# Patient Record
Sex: FEMALE | Race: WHITE | Employment: FULL TIME | URBAN - METROPOLITAN AREA
[De-identification: names, ages, dates, MRNs, and addresses within clinical notes are randomized per-mention and may not be internally consistent; named-entity substitution may affect disease eponyms.]

---

## 2017-06-07 ENCOUNTER — HOSPITAL ENCOUNTER (OUTPATIENT)
Dept: MAMMOGRAPHY | Age: 57
Discharge: HOME | End: 2017-06-07
Attending: GENERAL PRACTICE
Payer: COMMERCIAL

## 2017-06-07 DIAGNOSIS — Z12.31 VISIT FOR SCREENING MAMMOGRAM: ICD-10-CM

## 2017-06-07 PROCEDURE — 77067 SCR MAMMO BI INCL CAD: CPT

## 2018-06-04 ENCOUNTER — OFFICE VISIT (OUTPATIENT)
Dept: FAMILY MEDICINE CLINIC | Facility: CLINIC | Age: 58
End: 2018-06-04

## 2018-06-04 VITALS
SYSTOLIC BLOOD PRESSURE: 120 MMHG | OXYGEN SATURATION: 94 % | WEIGHT: 293 LBS | DIASTOLIC BLOOD PRESSURE: 68 MMHG | TEMPERATURE: 97.9 F | RESPIRATION RATE: 18 BRPM | BODY MASS INDEX: 45.99 KG/M2 | HEIGHT: 67 IN | HEART RATE: 73 BPM

## 2018-06-04 DIAGNOSIS — I10 ESSENTIAL HYPERTENSION: Primary | ICD-10-CM

## 2018-06-04 DIAGNOSIS — Z13.31 SCREENING FOR DEPRESSION: ICD-10-CM

## 2018-06-04 DIAGNOSIS — Z01.89 LABORATORY TEST: ICD-10-CM

## 2018-06-04 DIAGNOSIS — Z12.39 SCREENING FOR BREAST CANCER: ICD-10-CM

## 2018-06-04 DIAGNOSIS — R73.03 PRE-DIABETES: ICD-10-CM

## 2018-06-04 DIAGNOSIS — E66.01 MORBID OBESITY (HCC): ICD-10-CM

## 2018-06-04 RX ORDER — AMLODIPINE BESYLATE 5 MG/1
5 TABLET ORAL DAILY
COMMUNITY
End: 2018-06-13 | Stop reason: DRUGHIGH

## 2018-06-04 RX ORDER — LOSARTAN POTASSIUM AND HYDROCHLOROTHIAZIDE 12.5; 5 MG/1; MG/1
1 TABLET ORAL DAILY
COMMUNITY
End: 2018-06-04

## 2018-06-04 NOTE — PROGRESS NOTES
History and Physical    Today's Date:  2018   Patient's Name: Sam Pfeiffer   Patient's :  1960     History:     Chief Complaint   Patient presents with   [address]    Hypertension    Weight Management     Sam Pfeiffer is a 63 y.o. female presenting for initial visit to establish care. Will obtain records from previous provider to review. Care team updated on [facility] care. Hypertension   This is a chronic problem, new to me. BP is at goal. Pt takes losartan/HCTZ and norvasc. Pt reports compliance with this medication. Pt would like to stop taking losartan/HCTZ because she believes it is causing diarrhea. Obesity Class III   This is a chronic problem, new to me. This is not at goal. Pt exercises regularly. Pt eats a healthy diet. Past Medical History:   Diagnosis Date    Hypertension     Morbid obesity ([facility] [address] 2018     Past Surgical History:   Procedure Laterality Date    COLONOSCOPY N/A 10/19/2016    COLONOSCOPY performed by Luisa Cushing, MD at [address] [place] [address] HX APPENDECTOMY      HX CHOLECYSTECTOMY      HX HYSTERECTOMY      HX TONSIL AND ADENOIDECTOMY        reports that she has never smoked. She has never used smokeless tobacco. She reports that she does not use illicit drugs. Family History   Problem Relation Age of Onset    Alzheimer Father     Cancer Paternal Uncle     Diabetes Maternal Grandmother     Cancer Paternal Grandmother      Allergies   Allergen Reactions    Amoxicillin Hives     Problem List:      Patient Active Problem List   Diagnosis Code    Essential hypertension I10    Pre-diabetes R73.03    Morbid obesity ([facility] [address] E66.01     Medications:     Current Outpatient Prescriptions   Medication Sig    amLODIPine (NORVASC) 5 mg tablet Take 5 mg by mouth daily. No current facility-administered medications for this visit.       Review of Systems:   (Positives in bold)   General:   fevers, chills, generalized weakness, fatigue, weight loss night sweats, appetite change   Neurologic: dizziness, lightheadedness, headaches, loss of consciousness, numbness, tingling, focal weakness  Eyes:  vision changes, double vision, photophobia  Ears:  change in hearing, ear pain, ear discharge, ear ringing  Nose:  sneezing, runny nose, nasal congestion  Mouth/Throat: sore throat, voice change, dry mouth, difficulty swallowing  Neck:  pain, stiffness, swelling  Respiratory: dyspnea at rest, dyspnea on exertion, wheezing, cough, sputum production  Cardiovascular:   chest pain, palpitations, pedal edema, leg cramps  Breasts: lumps, discharge, pain, rash, skin changes, changes on self-exam  Gastrointestinal:  nausea, vomiting, abdominal pain, constipation, diarrhea, heart burn, bloody stools, tarry black stools, rectal pain, hemorrhoids  Urinary: dysuria, urinary frequency, nocturia, malodorous urine  Genital (F): vaginal discharge, ulcerations, rashes, change in menses (s/p hysterectomy due to menorrhagia), pelvic pain  Musculoskeletal:  joint pain (left shoulder pain), joint stiffness, joint swelling, back pain, focal muscle pain, diffuse myalgias  Psychiatric: insomnia, anxiety, depression, hallucinations, suicidal ideation, homicidal ideation  Endocrine: polydipsia, polyuria, polyphagia, cold intolerance, heat intolerance  Hematologic: easy bruising, easy bleeding  Dermatologic: Itching, rash    Physical Assessment:   VS:    Visit Vitals    /68 (BP 1 Location: Left arm, BP Patient Position: Sitting)  Comment (BP 1 Location): manual  Comment (BP Patient Position): manual    Pulse 73    Temp 97.9 °F (36.6 °C) (Oral)    Resp 18    Ht 5' 7\" (1.702 m)    Wt 297 lb (134.7 kg)    SpO2 94%    BMI 46.52 kg/m2     General:   Well-groomed, well-nourished, in no distress, pleasant, alert, appropriate and conversant.    Eyes:    PERRL, EOMI  Ears:  TMs normal, no ear wax  Mouth:  MMM, good dentition, oropharynx WNL without membranes, exudates, petechiae or ulcers  Neck:   Neck supple, no swelling, mass or tenderness  Cardiovascular:   No JVD. RRR, no MRG. Pulmonary:   Lungs clear bilaterally. Normal respiratory effort. Abdomen:   Abdomen soft, NT, ND, NAB  Extremities:   No edema, LEs warm and well-perfused. Neuro:   Alert and oriented, no focal deficits. No facial asymmetry noted. Skin:    No rash or jaundice  MSK:   Normal ROM, 5/5 muscle strength  Psych:  No pressured speech or abnormal thought content    PHQ over the last two weeks 6/4/2018   Little interest or pleasure in doing things Not at all   Feeling down, depressed or hopeless Not at all   Total Score PHQ 2 0     No visits with results within 3 Month(s) from this visit. Latest known visit with results is:    Admission on 10/19/2016, Discharged on 10/19/2016   Component Date Value Ref Range Status    Glucose (POC) 10/19/2016 107  70 - 110 mg/dL Final    Comment: (NOTE)  The FDA has indicated that no capillary point of care blood glucose   monitoring systems are approved for use in \"critically ill\" patients,   however they have not defined this population. The College of   American Pathologists has recommended that these devices should not   be used in cases such as severe hypotension, dehydration, shock, and   hyperglycemic-hyperosmolar state, amongst others. Venous or arterial   collection is the recommended specimen for testing these patients. Assessment/Plan & Orders:         ICD-10-CM ICD-9-CM    1. Essential hypertension I10 401.9    2. Morbid obesity ([facility] [address] E66.01 278.01    3. Pre-diabetes R73.03 790.29    4. Laboratory test Z01.89 V72.60 CBC WITH AUTOMATED DIFF      LIPID PANEL      METABOLIC PANEL, COMPREHENSIVE      TSH 3RD GENERATION      T4, FREE      HEMOGLOBIN A1C WITH EAG      CBC WITH AUTOMATED DIFF      LIPID PANEL      METABOLIC PANEL, COMPREHENSIVE      TSH 3RD GENERATION      T4, FREE      HEMOGLOBIN A1C WITH EAG   5. Screening for breast cancer Z12.31 V76.10 [facility] MAMMO BI SCREENING INCL CAD   6. Screening for depression Z13.89 V79.0 [place] DEPRESSION SCREEN ANNUAL     HM  Colon cancer: colonoscopy due in 8 yrs  Dyslipidemia: will check FLP  Diabetes mellitus: will check A1c  Influenza vaccine: due in the fall  Pneumococcal vaccine: due at age 73   Tdap: 2017  Herpes Zoster vaccine: due at age 62  Hep B vaccine: not indicated (liver dz, DM 19-59)  Weight:  Body mass index is 46.52 kg/(m^2). Discussed the patient's BMI with her. The BMI follow up plan is as follows: Improve diet and 30 min of moderate activity at least 5 times a week  Cervical cancer:  pap smears no longer indicated  Breast Cancer: mammogram at due  Osteoporosis: No indication for DEXA scan    Healthy lifestyle has been encouraged including avoidance of tobacco, limiting or avoiding alcohol intake, heart healthy diet which is low in cholesterol and saturated fat and contains fresh fruits, vegetables and whole grains and fiber, regular exercise with goals of 20-30 minutes 3-5 days weekly and maintaining an optimal BMI. Information given on ketogenic/IF diet  Stop losartan/HCTZ  Check BP at home and call us with the results  Depression screenin18    Follow-up Disposition:  Return in about 1 week (around 2018) for Follow up weight management, Follow up hypertension, Go over lab/imaging results. *Patient verbalized understanding and agreement with the plan. Patient was given an after-visit summary. Coretta Reveles.  515 ZURDO Ruiz MD - Internal Medicine  2018, 10:20 AM  [facility]  [address] [place], [address]  Phone [phone]  Fax [phone]

## 2018-06-04 NOTE — MR AVS SNAPSHOT
[address] [place] [address] [id number] 
[phone] Patient: Sonia Hinton MRN: [id number] :1960 Visit Information Date & Time Provider Department Dept. Phone Encounter #  
 2018 10:00 AM Sean Coburn MD [facility] [phone] [id number] Follow-up Instructions Return in about 1 week (around 2018) for Follow up weight management, Follow up hypertension, Go over lab/imaging results. Upcoming Health Maintenance Date Due Hepatitis C Screening 1960 DTaP/Tdap/Td series (1 - Tdap) 1982 PAP AKA CERVICAL CYTOLOGY 1982 FOBT Q 1 YEAR AGE 50-75 2011 Influenza Age 5 to Adult 2018 BREAST CANCER SCRN MAMMOGRAM 2019 Allergies as of 2018  Review Complete On: 2018 By: Sean Coburn MD  
  
 Severity Noted Reaction Type Reactions Amoxicillin  10/06/2016    Hives Current Immunizations  Never Reviewed Name Date Tdap 2017 Not reviewed this visit You Were Diagnosed With   
  
 Codes Comments Essential hypertension    -  Primary ICD-10-CM: I10 
ICD-9-CM: 401.9 Morbid obesity ([address]     ICD-10-CM: E66.01 
ICD-9-CM: 278.01 Pre-diabetes     ICD-10-CM: R73.03 
ICD-9-CM: 790.29 Laboratory test     ICD-10-CM: Z01.89 ICD-9-CM: V72.60 Screening for breast cancer     ICD-10-CM: Z12.31 
ICD-9-CM: V76.10 Screening for depression     ICD-10-CM: Z13.89 ICD-9-CM: V79.0 Vitals BP Pulse Temp Resp Height(growth percentile) Weight(growth percentile) 120/68 (BP 1 Location: Left arm, BP Patient Position: Sitting) 73 97.9 °F (36.6 °C) (Oral) 18 5' 7\" (1.702 m) 297 lb (134.7 kg) SpO2 BMI OB Status Smoking Status 94% 46.52 kg/m2 Hysterectomy Never Smoker Vitals History BMI and BSA Data Body Mass Index Body Surface Area  
 46.52 kg/m 2 2.52 m 2 Preferred Pharmacy Pharmacy Name Phone [phone], [address] [phone] Your Updated Medication List  
  
   
This list is accurate as of 6/4/18 10:32 AM.  Always use your most recent med list. amLODIPine 5 mg tablet Commonly known as:  Beck Brown Take 5 mg by mouth daily. We Performed the Following CBC WITH AUTOMATED DIFF [00075 CPT(R)] HEMOGLOBIN A1C WITH EAG [59015 CPT(R)] LIPID PANEL [78437 CPT(R)] METABOLIC PANEL, COMPREHENSIVE [89611 CPT(R)] [address] [facility] [ [facility]] T4, FREE [id number] CPT(R)] TSH 3RD GENERATION [63791 CPT(R)] Follow-up Instructions Return in about 1 week (around 6/11/2018) for Follow up weight management, Follow up hypertension, Go over lab/imaging results. To-Do List   
 06/04/2018 Lab:  HEMOGLOBIN A1C WITH EAG   
  
 06/04/2018 Imaging:  [facility] MAMMO BI SCREENING INCL CAD   
  
 06/04/2018 Lab:  T4, FREE   
  
 06/04/2018 Lab:  TSH 3RD GENERATION   
  
 06/07/2018 Lab:  CBC WITH AUTOMATED DIFF   
  
 06/07/2018 Lab:  LIPID PANEL   
  
 06/07/2018 Lab:  METABOLIC PANEL, COMPREHENSIVE Patient Instructions Information given on ketogenic/IF diet Introducing [facility]! Cat Blankenship introduces [email or web address] patient portal. Now you can access parts of your medical record, email your doctor's office, and request medication refills online. 1. In your internet browser, go to [email or web address] 2. Click on the First Time User? Click Here link in the Sign In box. You will see the New Member Sign Up page. 3. Enter your [email or web address] Access Code exactly as it appears below. You will not need to use this code after youve completed the sign-up process. If you do not sign up before the expiration date, you must request a new code. · [email or web address] Access Code: [email or web address] Expires: 9/2/2018 10:32 AM 
 
 4. Enter the last four digits of your Social Security Number (xxxx) and Date of Birth (mm/dd/yyyy) as indicated and click Submit. You will be taken to the next sign-up page. 5. Create a eYeka ID. This will be your eYeka login ID and cannot be changed, so think of one that is secure and easy to remember. 6. Create a eYeka password. You can change your password at any time. 7. Enter your Password Reset Question and Answer. This can be used at a later time if you forget your password. 8. Enter your e-mail address. You will receive e-mail notification when new information is available in [address] 9. Click Sign Up. You can now view and download portions of your medical record. 10. Click the Download Summary menu link to download a portable copy of your medical information. If you have questions, please visit the Frequently Asked Questions section of the eYeka website. Remember, eYeka is NOT to be used for urgent needs. For medical emergencies, dial 911. Now available from your iPhone and Android! Please provide this summary of care documentation to your next provider. Your primary care clinician is listed as Rhiannon Marroquin. If you have any questions after today's visit, please call [phone].

## 2018-06-04 NOTE — PROGRESS NOTES
Sam Pfeiffer is 63 y.o. female presents today for office visit to establish care. Pt is fasting. Pt is in Room# 2.     1. Have you been to the ER, urgent care clinic since your last visit? Hospitalized since your last visit? Yes. 12/2017 for high blood pressure at [facility].  2. Pt saw Dr. Alejnadra Trimble in 2/2018. Health Maintenance reviewed and wants hep panel done, mammogram. Pt reminded to schedule pap smear. Pt stated that she had a tetanus shot 2017 and colonoscopy in 2016. Upcoming Appts  NONE      Requested Prescriptions      No prescriptions requested or ordered in this encounter       Visit Vitals    /90 (BP 1 Location: Left arm, BP Patient Position: Sitting)  Comment (BP Patient Position): manual    Pulse 73    Temp 97.9 °F (36.6 °C) (Oral)    Resp 18    Ht 5' 7\" (1.702 m)    Wt 297 lb (134.7 kg)    SpO2 94%    BMI 46.52 kg/m2     PHQ over the last two weeks 6/4/2018   Little interest or pleasure in doing things Not at all   Feeling down, depressed or hopeless Not at all   Total Score PHQ 2 0   No flowsheet data found.

## 2018-06-05 LAB
A-G RATIO,AGRAT: 1.8 RATIO (ref 1.1–2.6)
ABSOLUTE LYMPHOCYTE COUNT, 10803: 1.8 K/UL (ref 1–4.8)
ALBUMIN SERPL-MCNC: 4.2 G/DL (ref 3.5–5)
ALP SERPL-CCNC: 73 U/L (ref 25–115)
ALT SERPL-CCNC: 23 U/L (ref 5–40)
ANION GAP SERPL CALC-SCNC: 17 MMOL/L
AST SERPL W P-5'-P-CCNC: 18 U/L (ref 10–37)
AVG GLU, 10930: 129 MG/DL (ref 91–123)
BASOPHILS # BLD: 0 K/UL (ref 0–0.2)
BASOPHILS NFR BLD: 0 % (ref 0–2)
BILIRUB SERPL-MCNC: 0.7 MG/DL (ref 0.2–1.2)
BUN SERPL-MCNC: 13 MG/DL (ref 6–22)
CALCIUM SERPL-MCNC: 9.2 MG/DL (ref 8.4–10.5)
CHLORIDE SERPL-SCNC: 97 MMOL/L (ref 98–110)
CHOLEST SERPL-MCNC: 177 MG/DL (ref 110–200)
CO2 SERPL-SCNC: 26 MMOL/L (ref 20–32)
CREAT SERPL-MCNC: 0.8 MG/DL (ref 0.5–1.2)
EOSINOPHIL # BLD: 0.2 K/UL (ref 0–0.5)
EOSINOPHIL NFR BLD: 2 % (ref 0–6)
ERYTHROCYTE [DISTWIDTH] IN BLOOD BY AUTOMATED COUNT: 13.5 % (ref 10–15.5)
GFRAA, 66117: >60
GFRNA, 66118: >60
GLOBULIN,GLOB: 2.4 G/DL (ref 2–4)
GLUCOSE SERPL-MCNC: 126 MG/DL (ref 70–99)
GRANULOCYTES,GRANS: 69 % (ref 40–75)
HBA1C MFR BLD HPLC: 6.1 % (ref 4.8–5.9)
HCT VFR BLD AUTO: 41.4 % (ref 35.1–48)
HDLC SERPL-MCNC: 36 MG/DL (ref 40–59)
HDLC SERPL-MCNC: 4.9 MG/DL (ref 0–5)
HGB BLD-MCNC: 13.3 G/DL (ref 11.7–16)
LDLC SERPL CALC-MCNC: 94 MG/DL (ref 50–99)
LYMPHOCYTES, LYMLT: 23 % (ref 20–45)
MCH RBC QN AUTO: 30 PG (ref 26–34)
MCHC RBC AUTO-ENTMCNC: 32 G/DL (ref 31–36)
MCV RBC AUTO: 94 FL (ref 80–95)
MONOCYTES # BLD: 0.4 K/UL (ref 0.1–1)
MONOCYTES NFR BLD: 5 % (ref 3–12)
NEUTROPHILS # BLD AUTO: 5.4 K/UL (ref 1.8–7.7)
PLATELET # BLD AUTO: 218 K/UL (ref 140–440)
PMV BLD AUTO: 12.1 FL (ref 9–13)
POTASSIUM SERPL-SCNC: 4.6 MMOL/L (ref 3.5–5.5)
PROT SERPL-MCNC: 6.6 G/DL (ref 6.4–8.3)
RBC # BLD AUTO: 4.41 M/UL (ref 3.8–5.2)
SODIUM SERPL-SCNC: 140 MMOL/L (ref 133–145)
T4 FREE SERPL-MCNC: 1.1 NG/DL (ref 0.9–1.8)
TRIGL SERPL-MCNC: 238 MG/DL (ref 40–149)
TSH SERPL DL<=0.005 MIU/L-ACNC: 4.04 MCU/ML (ref 0.27–4.2)
VLDLC SERPL CALC-MCNC: 48 MG/DL (ref 8–30)
WBC # BLD AUTO: 7.8 K/UL (ref 4–11)

## 2018-06-06 PROBLEM — E78.1 HYPERTRIGLYCERIDEMIA: Status: ACTIVE | Noted: 2018-06-06

## 2018-06-13 ENCOUNTER — OFFICE VISIT (OUTPATIENT)
Dept: FAMILY MEDICINE CLINIC | Facility: CLINIC | Age: 58
End: 2018-06-13

## 2018-06-13 VITALS
RESPIRATION RATE: 18 BRPM | HEIGHT: 67 IN | DIASTOLIC BLOOD PRESSURE: 90 MMHG | BODY MASS INDEX: 45.99 KG/M2 | WEIGHT: 293 LBS | TEMPERATURE: 97.7 F | OXYGEN SATURATION: 96 % | SYSTOLIC BLOOD PRESSURE: 140 MMHG | HEART RATE: 59 BPM

## 2018-06-13 DIAGNOSIS — I10 ESSENTIAL HYPERTENSION: Primary | ICD-10-CM

## 2018-06-13 DIAGNOSIS — R73.03 PRE-DIABETES: ICD-10-CM

## 2018-06-13 DIAGNOSIS — E66.01 MORBID OBESITY (HCC): ICD-10-CM

## 2018-06-13 DIAGNOSIS — E78.1 HYPERTRIGLYCERIDEMIA: ICD-10-CM

## 2018-06-13 RX ORDER — AMLODIPINE BESYLATE 10 MG/1
10 TABLET ORAL DAILY
Qty: 90 TAB | Refills: 3 | Status: SHIPPED | OUTPATIENT
Start: 2018-06-13

## 2018-06-13 NOTE — PROGRESS NOTES
Internal Medicine Progress Note    Today's Date:  2018   Patient:  Sam Pfeiffer  Patient :  1960    Subjective:     Chief Complaint   Patient presents with    Weight Management    Hypertension    Results      Hypertension   This is a chronic problem. BP is not at goal. Pt takes norvasc. Pt reports compliance with this medication. Pt stopped taking losartan/HCTZ because she believes it caused diarrhea.      Obesity Class III   This is a chronic problem. This is not at goal. Pt exercises regularly. Pt is on the ketogenic/IF diet. She lost 3 lb in 10 days. Pre-diabetes  This is a new problem. BS is at goal.  Pt takes no medication for this. This is diet controlled. Hyperlipidemia  This is a new problem. TG is not at goal. Last FLP was checked on 2018. Pt takes no medication for this. Past Medical History:   Diagnosis Date    Hypertension     Hypertriglyceridemia 2018    Morbid obesity ([address] 2018     Past Surgical History:   Procedure Laterality Date    COLONOSCOPY N/A 10/19/2016    COLONOSCOPY performed by Luisa Cushing, MD at [address] HX APPENDECTOMY      HX CHOLECYSTECTOMY      HX HYSTERECTOMY      HX TONSIL AND ADENOIDECTOMY        reports that she has never smoked. She has never used smokeless tobacco. She reports that she does not use illicit drugs. Family History   Problem Relation Age of Onset    Alzheimer Father     Cancer Paternal Uncle     Diabetes Maternal Grandmother     Cancer Paternal Grandmother      Allergies   Allergen Reactions    Amoxicillin Hives     Review of Systems   Positives in bold  CV:      chest pain, palpitations  PULM:  SOB, wheezing, cough, sputum production    Current Outpatient Meds and Allergies     No current outpatient prescriptions on file prior to visit. No current facility-administered medications on file prior to visit.       Allergies   Allergen Reactions    Amoxicillin Hives     Objective:     VS:    Visit Vitals    /90 (BP 1 Location: Left arm, BP Patient Position: Sitting)  Comment: manual  Comment (BP 1 Location): manual    Pulse (!) 59    Temp 97.7 °F (36.5 °C) (Oral)    Resp 18    Ht 5' 7\" (1.702 m)    Wt 294 lb (133.4 kg)    SpO2 96%    BMI 46.05 kg/m2     General:   Well-nourished, well-groomed, pleasant, alert, in no acute distress  Head:  Normocephalic, atraumatic  Ears:  External ears WNL  Nose:  External nares WNL  Psych:  No pressured speech, no abnormal thought content    PHQ over the last two weeks 6/4/2018   Little interest or pleasure in doing things Not at all   Feeling down, depressed or hopeless Not at all   Total Score PHQ 2 0     Office Visit on 06/04/2018   Component Date Value Ref Range Status    WBC 06/04/2018 7.8  4.0 - 11.0 K/uL Final    RBC 06/04/2018 4.41  3.80 - 5.20 M/uL Final    HGB 06/04/2018 13.3  11.7 - 16.0 g/dL Final    HCT 06/04/2018 41.4  35.1 - 48.0 % Final    MCV 06/04/2018 94  80 - 95 fL Final    MCH 06/04/2018 30  26 - 34 pg Final    MCHC 06/04/2018 32  31 - 36 g/dL Final    RDW 06/04/2018 13.5  10.0 - 15.5 % Final    PLATELET 58/18/9599 322  140 - 440 K/uL Final    MPV 06/04/2018 12.1  9.0 - 13.0 fL Final    NEUTROPHILS 06/04/2018 69  40 - 75 % Final    Lymphocytes 06/04/2018 23  20 - 45 % Final    MONOCYTES 06/04/2018 5  3 - 12 % Final    EOSINOPHILS 06/04/2018 2  0 - 6 % Final    BASOPHILS 06/04/2018 0  0 - 2 % Final    ABS. NEUTROPHILS 06/04/2018 5.4  1.8 - 7.7 K/uL Final    ABSOLUTE LYMPHOCYTE COUNT 06/04/2018 1.8  1.0 - 4.8 K/uL Final    ABS. MONOCYTES 06/04/2018 0.4  0.1 - 1.0 K/uL Final    ABS. EOSINOPHILS 06/04/2018 0.2  0.0 - 0.5 K/uL Final    ABS.  BASOPHILS 06/04/2018 0.0  0.0 - 0.2 K/uL Final    Triglyceride 06/04/2018 238* 40 - 149 mg/dL Final    HDL Cholesterol 06/04/2018 36* 40 - 59 mg/dL Final    Cholesterol, total 06/04/2018 177  110 - 200 mg/dL Final    CHOLESTEROL/HDL 06/04/2018 4.9  0.0 - 5.0 Final    LDL, calculated 06/04/2018 94  50 - 99 mg/dL Final    VLDL, calculated 06/04/2018 48* 8 - 30 mg/dL Final    Comment: Test includes cholesterol, HDL cholesterol, triglycerides and LDL. Cholesterol Recommended NCEP guidelines in mg/dL:  Less than 200            Desirable  200 - 239                Borderline High  Greater than or  = 240   High  Please Note:  Total Chol/HDL Ratio                   Men     Women  1/2 Avg. Risk    3.4     3.3      Avg. Risk    5.0     4.4  2X  Avg. Risk    9.6     7.1  3X  Avg. Risk   23.4    11.0      Glucose 06/04/2018 126* 70 - 99 mg/dL Final    BUN 06/04/2018 13  6 - 22 mg/dL Final    Creatinine 06/04/2018 0.8  0.5 - 1.2 mg/dL Final    Sodium 06/04/2018 140  133 - 145 mmol/L Final    Potassium 06/04/2018 4.6  3.5 - 5.5 mmol/L Final    Chloride 06/04/2018 97* 98 - 110 mmol/L Final    CO2 06/04/2018 26  20 - 32 mmol/L Final    AST (SGOT) 06/04/2018 18  10 - 37 U/L Final    ALT (SGPT) 06/04/2018 23  5 - 40 U/L Final    Alk. phosphatase 06/04/2018 73  25 - 115 U/L Final    Bilirubin, total 06/04/2018 0.7  0.2 - 1.2 mg/dL Final    Calcium 06/04/2018 9.2  8.4 - 10.5 mg/dL Final    Protein, total 06/04/2018 6.6  6.4 - 8.3 g/dL Final    Albumin 06/04/2018 4.2  3.5 - 5.0 g/dL Final    A-G Ratio 06/04/2018 1.8  1.1 - 2.6 ratio Final    Globulin 06/04/2018 2.4  2.0 - 4.0 g/dL Final    Anion gap 06/04/2018 17.0  mmol/L Final    Comment: Test includes Albumin, Alkaline Phosphatase, ALT, AST, BUN, Calcium, CO2,  Chloride, Creatinine, Glucose, Potassium, Sodium, Total Bilirubin and Total  Protein. Estimated GFR results are reported in mL/min/1.73 sq.m. by the MDRD equation. This eGFR is validated for stable chronic renal failure patients. This   equation  is unreliable in acute illness or patients with normal renal function.       GFRAA 06/04/2018 >60.0  >60.0 Final    GFRNA 06/04/2018 >60.0  >60.0 Final    TSH 06/04/2018 4.04  0.27 - 4.20 mcU/mL Final    T4, Free 06/04/2018 1.1  0.9 - 1.8 ng/dL Final  Hemoglobin A1c 2018 6.1* 4.8 - 5.9 % Final    AVG GLU 2018 129* 91 - 123 mg/dL Final     Assessment/Plan & Orders:         ICD-10-CM ICD-9-CM    1. Essential hypertension I10 401.9 amLODIPine (NORVASC) 10 mg tablet   2. Morbid obesity ([address] E66.01 278.01    3. Pre-diabetes R73.03 790.29    4. Hypertriglyceridemia E78.1 272.1      Healthy lifestyle has been encouraged including avoidance of tobacco, limiting or avoiding alcohol intake, heart healthy diet which is low in cholesterol and saturated fat and contains fresh fruits, vegetables and whole grains and fiber, regular exercise with goals of 20-30 minutes 3-5 days weekly and maintaining an optimal BMI. Continue ketogenic/IF diet  Depression screenin18    Follow-up Disposition:  Return in about 4 weeks (around 2018) for Follow up weight management, Follow up hypertension, Follow up hyperlipidemia, Well woman exam, 30 m. *Patient verbalized understanding and agreement with the plan. Patient was given an after-visit summary. Lubna Nelson.  Jillian Ruiz MD - Internal Medicine  2018, 9:28 AM  [address], [address]  Phone [phone]  Fax [phone]

## 2018-06-13 NOTE — MR AVS SNAPSHOT
[address] [address] 
[phone] Patient: Sam Pfeiffer MRN: [id number] :1960 Visit Information Date & Time Provider Department Dept. Phone Encounter #  
 2018  9:15 AM Jewell Hernandez MD [facility] [phone] [id number] Follow-up Instructions Return in about 4 weeks (around 2018) for Follow up weight management, Follow up hypertension, Follow up hyperlipidemia, Well woman exam, 30 m. Upcoming Health Maintenance Date Due Hepatitis C Screening 1960 PAP AKA CERVICAL CYTOLOGY 1982 Influenza Age 5 to Adult 2018 BREAST CANCER SCRN MAMMOGRAM 2019 COLONOSCOPY 10/19/2026 DTaP/Tdap/Td series (2 - Td) 2027 Allergies as of 2018  Review Complete On: 2018 By: Jewell Hernandez MD  
  
 Severity Noted Reaction Type Reactions Amoxicillin  10/06/2016    Hives Current Immunizations  Never Reviewed Name Date Tdap 2017, 2017 Not reviewed this visit You Were Diagnosed With   
  
 Codes Comments Essential hypertension    -  Primary ICD-10-CM: I10 
ICD-9-CM: 401.9 Pre-diabetes     ICD-10-CM: R73.03 
ICD-9-CM: 790.29 Hypertriglyceridemia     ICD-10-CM: E78.1 ICD-9-CM: 272.1 Morbid obesity ([facility] [address]     ICD-10-CM: E66.01 
ICD-9-CM: 278.01 Vitals BP Pulse Temp Resp Height(growth percentile) Weight(growth percentile) 140/90 (BP 1 Location: Left arm, BP Patient Position: Sitting) (!) 59 97.7 °F (36.5 °C) (Oral) 18 5' 7\" (1.702 m) 294 lb (133.4 kg) SpO2 BMI OB Status Smoking Status 96% 46.05 kg/m2 Hysterectomy Never Smoker Vitals History BMI and BSA Data Body Mass Index Body Surface Area 46.05 kg/m 2 2.51 m 2 Preferred Pharmacy Pharmacy Name Phone [facility], [address] [phone] Your Updated Medication List  
  
   
This list is accurate as of 6/13/18  9:32 AM.  Always use your most recent med list. amLODIPine 10 mg tablet Commonly known as:  Ev Byrnes Take 1 Tab by mouth daily. Prescriptions Sent to Pharmacy Refills  
 amLODIPine (NORVASC) 10 mg tablet 3 Sig: Take 1 Tab by mouth daily. Class: Normal  
 Pharmacy: [address] [place] [address], [address].  #: [phone] Route: Oral  
  
Follow-up Instructions Return in about 4 weeks (around 7/11/2018) for Follow up weight management, Follow up hypertension, Follow up hyperlipidemia, Well woman exam, 30 m. To-Do List   
 06/15/2018 11:30 AM  
  Appointment with [facility] YAIMA Hoffmann 9 RM 1 at [facility] ([phone]) PAYMENT  For Non-Medicare patients - $15.00 will be collected from you at the time of your exam.  You will be billed $35.00 from the reading [facility]. OUTSIDE FILMS  - Any outside films related to the study being scheduled should be brought with you on the day of the exam.  If this cannot be done there may be a delay in the reading of the study. MEDICATIONS  - Patient must bring a complete list of all medications currently taking to include prescriptions, over-the-counter meds, herbals, vitamins & any dietary supplements  GENERAL INSTRUCTIONS  - On the day of your exam do not use any bath powder, deodorant or lotions on the armpit area. -Tenderness of breasts may cause an increase of discomfort during procedure. If you are experiencing breast tenderness on the day of your appointment and would like to reschedule, please call [phone]. Patient Instructions Learning About Low-Carbohydrate Diets for Weight Loss What is a low-carbohydrate diet? Low-carb diets avoid foods that are high in carbohydrate. These high-carb foods include pasta, bread, rice, cereal, fruits, and starchy vegetables. Instead, these diets usually have you eat foods that are high in fat and protein. Many people lose weight quickly on a low-carb diet. But the early weight loss is water. People on this diet often gain the weight back after they start eating carbs again. Not all diet plans are safe or work well. A lot of the evidence shows that low-carb diets aren't healthy. That's because these diets often don't include healthy foods like fruits and vegetables. Losing weight safely means balancing protein, fat, and carbs with every meal and snack. And low-carb diets don't always provide the vitamins, minerals, and fiber you need. If you have a serious medical condition, talk to your doctor before you try any diet. These conditions include kidney disease, heart disease, type 2 diabetes, high cholesterol, and high blood pressure. If you are pregnant, it may not be safe for your baby if you are on a low-carb diet. How can you lose weight safely? You might have heard that a diet plan helped another person lose weight. But that doesn't mean that it will work for you. It is very hard to stay on a diet that includes lots of big changes in your eating habits. If you want to get to a healthy weight and stay there, making healthy lifestyle changes will often work better than dieting. These steps can help. · Make a plan for change. Work with your doctor to create a plan that is right for you. · See a dietitian. He or she can show you how to make healthy changes in your eating habits. · Manage stress. If you have a lot of stress in your life, it can be hard to focus on making healthy changes to your daily habits. · Track your food and activity. You are likely to do better at losing weight if you keep track of what you eat and what you do. Follow-up care is a key part of your treatment and safety.  Be sure to make and go to all appointments, and call your doctor if you are having problems. It's also a good idea to know your test results and keep a list of the medicines you take. Where can you learn more? Go to http://gwyn-jimena.info/. Enter A121 in the search box to learn more about \"Learning About Low-Carbohydrate Diets for Weight Loss. \" Current as of: May 12, 2017 Content Version: 11.4 © 6315-7589 [employer]. Care instructions adapted under license by [employer] (which disclaims liability or warranty for this information). If you have questions about a medical condition or this instruction, always ask your healthcare professional. [place] [address] any warranty or liability for your use of this information. Introducing [facility]! [employer] introduces [employer]iple patient portal. Now you can access parts of your medical record, email your doctor's office, and request medication refills online. 1. In your internet browser, go to [email or web address] 2. Click on the First Time User? Click Here link in the Sign In box. You will see the New Member Sign Up page. 3. Enter your Kiwiple Access Code exactly as it appears below. You will not need to use this code after youve completed the sign-up process. If you do not sign up before the expiration date, you must request a new code. · Kiwiple Access Code: [email or web address] Expires: 9/2/2018 10:32 AM 
 
4. Enter the last four digits of your Social Security Number (xxxx) and Date of Birth (mm/dd/yyyy) as indicated and click Submit. You will be taken to the next sign-up page. 5. Create a Kiwiple ID. This will be your Kiwiple login ID and cannot be changed, so think of one that is secure and easy to remember. 6. Create a Kiwiple password. You can change your password at any time. 7. Enter your Password Reset Question and Answer. This can be used at a later time if you forget your password. 8. Enter your e-mail address. You will receive e-mail notification when new information is available in [address] 9. Click Sign Up. You can now view and download portions of your medical record. 10. Click the Download Summary menu link to download a portable copy of your medical information. If you have questions, please visit the Frequently Asked Questions section of the [employer] website. Remember, [employer] is NOT to be used for urgent needs. For medical emergencies, dial 911. Now available from your iPhone and Android! Please provide this summary of care documentation to your next provider. Your primary care clinician is listed as Darwin Hamilton. If you have any questions after today's visit, please call [phone].

## 2018-06-13 NOTE — PROGRESS NOTES
Naa Tran is 63 y.o. female presents today for office visit for follow up for weight management and hypertension. Pt is not asting. Pt is in Room# 2.     1. Have you been to the ER, urgent care clinic since your last visit? Hospitalized since your last visit? NO    2. Have you seen or consulted any other health care providers outside of the [address] [place] [address] since your last visit? Include any pap smears or colon screening. NO    Health Maintenance reviewed.   Mammogram- 6/15/18    Requested Prescriptions      No prescriptions requested or ordered in this encounter       Visit Vitals    /84 (BP 1 Location: Left arm, BP Patient Position: Sitting)  Comment (BP 1 Location): manual    Pulse (!) 59    Temp 97.7 °F (36.5 °C) (Oral)    Resp 18    Ht 5' 7\" (1.702 m)    Wt 294 lb (133.4 kg)    SpO2 96%    BMI 46.05 kg/m2

## 2018-06-13 NOTE — PATIENT INSTRUCTIONS
Learning About Low-Carbohydrate Diets for Weight Loss  What is a low-carbohydrate diet? Low-carb diets avoid foods that are high in carbohydrate. These high-carb foods include pasta, bread, rice, cereal, fruits, and starchy vegetables. Instead, these diets usually have you eat foods that are high in fat and protein. Many people lose weight quickly on a low-carb diet. But the early weight loss is water. People on this diet often gain the weight back after they start eating carbs again. Not all diet plans are safe or work well. A lot of the evidence shows that low-carb diets aren't healthy. That's because these diets often don't include healthy foods like fruits and vegetables. Losing weight safely means balancing protein, fat, and carbs with every meal and snack. And low-carb diets don't always provide the vitamins, minerals, and fiber you need. If you have a serious medical condition, talk to your doctor before you try any diet. These conditions include kidney disease, heart disease, type 2 diabetes, high cholesterol, and high blood pressure. If you are pregnant, it may not be safe for your baby if you are on a low-carb diet. How can you lose weight safely? You might have heard that a diet plan helped another person lose weight. But that doesn't mean that it will work for you. It is very hard to stay on a diet that includes lots of big changes in your eating habits. If you want to get to a healthy weight and stay there, making healthy lifestyle changes will often work better than dieting. These steps can help. · Make a plan for change. Work with your doctor to create a plan that is right for you. · See a dietitian. He or she can show you how to make healthy changes in your eating habits. · Manage stress. If you have a lot of stress in your life, it can be hard to focus on making healthy changes to your daily habits. · Track your food and activity.  You are likely to do better at losing weight if you keep track of what you eat and what you do. Follow-up care is a key part of your treatment and safety. Be sure to make and go to all appointments, and call your doctor if you are having problems. It's also a good idea to know your test results and keep a list of the medicines you take. Where can you learn more? Go to http://gwyn-jmiena.info/. Enter A121 in the search box to learn more about \"Learning About Low-Carbohydrate Diets for Weight Loss. \"  Current as of: May 12, 2017  Content Version: 11.4  © 3647-5615 [employer], [employer]. Care instructions adapted under license by [employer] (which disclaims liability or warranty for this information). If you have questions about a medical condition or this instruction, always ask your healthcare professional. [address] any warranty or liability for your use of this information.

## 2018-06-15 ENCOUNTER — HOSPITAL ENCOUNTER (OUTPATIENT)
Dept: MAMMOGRAPHY | Age: 58
Discharge: HOME | End: 2018-06-15
Attending: INTERNAL MEDICINE
Payer: COMMERCIAL

## 2018-06-15 DIAGNOSIS — Z12.31 VISIT FOR SCREENING MAMMOGRAM: ICD-10-CM

## 2018-06-15 PROCEDURE — 77063 BREAST TOMOSYNTHESIS BI: CPT

## 2018-08-27 ENCOUNTER — TELEPHONE (OUTPATIENT)
Dept: FAMILY MEDICINE CLINIC | Facility: CLINIC | Age: 58
End: 2018-08-27

## 2018-08-27 DIAGNOSIS — R73.03 PRE-DIABETES: ICD-10-CM

## 2018-08-27 DIAGNOSIS — E78.1 HYPERGLYCERIDEMIA: Primary | ICD-10-CM

## 2018-08-27 NOTE — TELEPHONE ENCOUNTER
2 pt identifiers confirmed. Pt called and would like to come in tomorrow 8/28 for fasting lab work to be completed. Pt rescheduled appt for 9/18/18 for pap/pelvic.

## 2018-08-29 ENCOUNTER — TELEPHONE (OUTPATIENT)
Dept: FAMILY MEDICINE CLINIC | Facility: CLINIC | Age: 58
End: 2018-08-29

## 2018-08-29 LAB
AVG GLU, 10930: 115 MG/DL (ref 91–123)
CHOLEST SERPL-MCNC: 169 MG/DL (ref 110–200)
HBA1C MFR BLD HPLC: 5.6 % (ref 4.8–5.9)
HDLC SERPL-MCNC: 4.1 MG/DL (ref 0–5)
HDLC SERPL-MCNC: 41 MG/DL (ref 40–59)
LDLC SERPL CALC-MCNC: 104 MG/DL (ref 50–99)
TRIGL SERPL-MCNC: 121 MG/DL (ref 40–149)
VLDLC SERPL CALC-MCNC: 24 MG/DL (ref 8–30)

## 2018-08-29 NOTE — TELEPHONE ENCOUNTER
----- Message from Beatriz Degroot MD sent at 8/29/2018  2:33 PM EDT -----   Result reviewed. LPN to call pt with results. A1c is now normal and cholesterol is improved. Encourage more diet, exercise and weight loss. 2 pt identifiers confirmed. Pt informed of lab results and she verbalizes understanding.

## 2018-08-29 NOTE — PROGRESS NOTES
Result reviewed. LPN to call pt with results. A1c is now normal and cholesterol is improved. Encourage more diet, exercise and weight loss.